# Patient Record
Sex: FEMALE | Race: WHITE | Employment: UNEMPLOYED | URBAN - METROPOLITAN AREA
[De-identification: names, ages, dates, MRNs, and addresses within clinical notes are randomized per-mention and may not be internally consistent; named-entity substitution may affect disease eponyms.]

---

## 2017-02-21 ENCOUNTER — TELEPHONE (OUTPATIENT)
Dept: INTERNAL MEDICINE | Facility: CLINIC | Age: 57
End: 2017-02-21

## 2017-02-21 NOTE — TELEPHONE ENCOUNTER
2/21/2017    Call Regarding ReattributionPhysical    Attempt 1    Message No voicemail available    Comments:       Outreach   CC

## 2017-03-03 NOTE — TELEPHONE ENCOUNTER
3/3/2017    Call Regarding ReattributionPhysical    Attempt 2    Message No voicemail availableNo voicemail available    Comments:       Outreach   CC